# Patient Record
Sex: MALE | Race: WHITE
[De-identification: names, ages, dates, MRNs, and addresses within clinical notes are randomized per-mention and may not be internally consistent; named-entity substitution may affect disease eponyms.]

---

## 2019-11-14 ENCOUNTER — HOSPITAL ENCOUNTER (OUTPATIENT)
Dept: HOSPITAL 62 - OD | Age: 44
End: 2019-11-14
Attending: FAMILY MEDICINE
Payer: COMMERCIAL

## 2019-11-14 DIAGNOSIS — K76.0: Primary | ICD-10-CM

## 2019-11-14 DIAGNOSIS — Z83.3: ICD-10-CM

## 2019-11-14 DIAGNOSIS — M25.532: ICD-10-CM

## 2019-11-14 LAB
ALBUMIN SERPL-MCNC: 4.6 G/DL (ref 3.5–5)
ALP SERPL-CCNC: 51 U/L (ref 38–126)
ANION GAP SERPL CALC-SCNC: 9 MMOL/L (ref 5–19)
AST SERPL-CCNC: 36 U/L (ref 17–59)
BILIRUB DIRECT SERPL-MCNC: 0.2 MG/DL (ref 0–0.4)
BILIRUB SERPL-MCNC: 1.6 MG/DL (ref 0.2–1.3)
BUN SERPL-MCNC: 10 MG/DL (ref 7–20)
CALCIUM: 9.4 MG/DL (ref 8.4–10.2)
CHLORIDE SERPL-SCNC: 104 MMOL/L (ref 98–107)
CHOLEST SERPL-MCNC: 160.07 MG/DL (ref 0–200)
CO2 SERPL-SCNC: 29 MMOL/L (ref 22–30)
GLUCOSE SERPL-MCNC: 101 MG/DL (ref 75–110)
LDLC SERPL DIRECT ASSAY-MCNC: 101 MG/DL (ref ?–100)
POTASSIUM SERPL-SCNC: 4.9 MMOL/L (ref 3.6–5)
PROT SERPL-MCNC: 7.8 G/DL (ref 6.3–8.2)
TRIGL SERPL-MCNC: 114 MG/DL (ref ?–150)
VLDLC SERPL CALC-MCNC: 23 MG/DL (ref 10–31)

## 2019-11-14 PROCEDURE — 36415 COLL VENOUS BLD VENIPUNCTURE: CPT

## 2019-11-14 PROCEDURE — 80053 COMPREHEN METABOLIC PANEL: CPT

## 2019-11-14 PROCEDURE — 80061 LIPID PANEL: CPT

## 2019-11-14 NOTE — RADIOLOGY REPORT (SQ)
EXAM DESCRIPTION:  WRIST LEFT 2 VIEWS



COMPLETED DATE/TIME:  11/14/2019 8:17 am



REASON FOR STUDY:  PAIN IN LEFT WRIST K76.0  FATTY (CHANGE OF) LIVER, NOT ELSEWHERE CLASSIFIED Z83.3 
 FAMILY HISTORY OF DIABETES MELLITUS M25.532  PAIN IN LEFT WRIST



COMPARISON:  None.



NUMBER OF VIEWS:  Two views.



TECHNIQUE:  AP and lateral radiographic images acquired of the left wrist.



LIMITATIONS:  None.



FINDINGS:  MINERALIZATION: Normal.

BONES: No acute fracture or dislocation.  No worrisome bone lesions.  Normal alignment.

SOFT TISSUES: No soft tissue swelling.  No foreign body.

OTHER: No other significant finding.



IMPRESSION:  NEGATIVE STUDY OF THE LEFT WRIST. NO RADIOGRAPHIC EVIDENCE OF ACUTE INJURY.



TECHNICAL DOCUMENTATION:  JOB ID:  [id number]

 2011 [employer]- All Rights Reserved



Reading location - IP/workstation name: [email or web address]

## 2020-12-19 ENCOUNTER — HOSPITAL ENCOUNTER (OUTPATIENT)
Dept: HOSPITAL 62 - RDC | Age: 45
End: 2020-12-19
Attending: NURSE PRACTITIONER
Payer: COMMERCIAL

## 2020-12-19 VITALS — SYSTOLIC BLOOD PRESSURE: 135 MMHG | DIASTOLIC BLOOD PRESSURE: 79 MMHG

## 2020-12-19 DIAGNOSIS — Z20.828: Primary | ICD-10-CM

## 2020-12-19 PROCEDURE — C9803 HOPD COVID-19 SPEC COLLECT: HCPCS

## 2020-12-19 PROCEDURE — 99211 OFF/OP EST MAY X REQ PHY/QHP: CPT

## 2020-12-19 PROCEDURE — 87635 SARS-COV-2 COVID-19 AMP PRB: CPT

## 2020-12-19 PROCEDURE — 99201: CPT

## 2020-12-19 NOTE — ER RDC ASSESSMENT REPORT
Intake





- In the Last 14 days


Have you traveled outside [place]?: No


Have you been in close contact with someone CONFIRMED: Yes


Worked in Healthcare?: No





- Symptoms


Subjective Fever(Felt feverish): No


Chills: No


Muscule Aches: No


Runny Nose: No


Sore Throat: No


Cough (New or worsening chronic cough): No


Shortness of breath: No


Nausea or Vomiting: No


Headache: No


Abdominal Pain: No


Diarrhea(3 or more loose stools in last 24 hours): No





- Do you have any of the following


Chronic lung disease: Asthma or emphysema or COPD: No


Cystic Fibrosis: No


Diabetes: No


High Blood Pressure: No


Cardiovascular Disease: No


Chronic Kidney Disease: No


Chronic Liver Disease: Yes


Chronic Liver Disease Comment: 


Patient reports fatty liver.





Chronic blood disorder like Sickle Cell Disease: No


Weak immune system due to disease or medication: No


Neurologic condition that limits movement: No


Developmental delay - Moderate to Severe: No


Recent (within past 2 weeks) or current Pregnancy: No


Morbid Obesity (>100 pounds over ideal weight): No





- Objective


Temperature: 99.5 F


Pulse Rate: 81


Respiratory Rate: 18


Blood Pressure: 135/79


O2 Sat by Pulse Oximetry: 97


Objective: 


Patient is a well-appearing 45-year-old male, who presents today for COVID-19 

screening.








Disposition: Home; Selfcare





General





- General


Stated Complaint: COVID-19 screening


Mode of Arrival: Ambulatory


Information source: Patient


Notes: 


The patient was evaluated during the global COVID-19 pandemic. That diagnosis 

was suspected/considered upon initial presentation. Their evaluation, treatment,

and testing was consistent with current guidelines for patients who present with

complaints or symptoms that may be related to COVID-19.





Patient reports having close contact exposure to a COVID-19 lab confirmed 

positive individual.








- HPI


Patient complains to provider of: Asymptomatic, no complaint


Quality of pain: No pain


Severity: None


Pain Level: Denies


Associated symptoms: None


Exacerbated by: Denies


Relieved by: Denies


Similar symptoms previously: No


Recently seen / treated by doctor: No





Past Medical History





- General


Information source: Patient





- Social History


Smoking Status: Never Smoker


Cigarette use (# per day): No


Chew tobacco use (# tins/day): No


Smoking Education Provided: No


Frequency of alcohol use: Occasional


Drug Abuse: None


Occupation: [employer] duty [employer]


Lives with: Family


Patient has suicidal ideation: No


Patient has homicidal ideation: No





Physical Exam





- General


General appearance: Appears well


In distress: None


Notes: 


PHYSICAL EXAMINATION: 


GENERAL: Well-appearing with No Acute Distress noted.  


HEAD: Atraumatic, Normocephalic. 


EYES: Sclera anicteric, Conjunctiva are pink and moist. 


ENT: Nares patent. Moist mucous membranes. 


NECK: Normal range of motion, supple without lymphadenopathy. 


LUNGS: CTAB and equal. No wheezes rales or rhonchi. 


HEART: Regular rate and rhythm without murmurs. 


ABDOMEN: Soft, nontender, normal bowel sounds, no guarding. 


EXTREMITIES: Normal range of motion, no pitting edema. No cyanosis. 


BACK: No midline or CVA tenderness. No step-off or deformity. 


NEUROLOGICAL: Cranial nerves grossly intact. Normal speech. Normal gait.


PSYCH: Calm, Cooperative, and answers questions appropriately. Normal mood and 

affect.


SKIN: Warm, Dry, Normal color and Turgor, No obvious lesions or rash noted.











Diagnostic Results


Laboratory Results: 


Patient advised at this time they are considered a Person Under Investigation 

(PUI) for the COVID-19 Coronavirus. They have been made aware it is currently 

taking 3 to 5 days to receive their results. Patient advised The [facility] will call to notify them of a POSITIVE result, and an [facility] of [facility] team member will call to notify them of a NEGATIVE result.





Patient Education/Counseling


Counseling/Education: 


Patient presents with upper respiratory symptoms worrisome for possible COVID-

19.  Patient does not have symptoms worrisome as an emergency such as difficulty

breathing, shortness of breath, chest pain, pressure, confusion or cyanosis.  

Patient appears suitable for discharge.  Patient's vital signs are stable and 

patient is nontoxic in appearance.  Good return precautions have been discussed 

with patient, patient verbalized understanding and is agreeable with discharge 

plan of care at this time.





Patient provided COVID-19 discharge instructions to include:





As a person under investigation for COVID-19, the [place] department of 

Health and Human Services, division of public health advises you to adhere to 

the following guidance until your test results are reported to you.  If your 

test result is positive, you will receive additional information from your 

provider and your local health department at that time.


 


Remain at home until you are cleared by the health provider or public health 

authorities.


 


Keep a log of visitors to your home, notify any visitors to your home of your 

isolation status.


 


If you plan to move to a new address or leave the county, notify the local 

health department in your County.


 


Call your doctor or seek care if you have an urgent medical need.  Before 

seeking medical care, call ahead to get instructions from the provider before 

arriving at the medical office clinic or hospital.  Notify them that you are 

being tested for the virus that causes COVID-19 so that arrangements can be 

made, as necessary, to prevent transmission to others in the healthcare setting.

 Next, notify the local health department in your county.


 


If a medical emergency arises and you need to call 911, inform dispatch and the 

first responders that you are being tested for the virus that causes COVID-19.  

Next, notify the local health department in your county.





Guidance for worsening S/SX: 


For worsening symptoms, patient has been advised to contact their Primary Care 

Provider, or go to the nearest Emergency Department.








RDC Discharge





- Discharge


Clinical Impression: 


 COVID-19 Screening 





URI (upper respiratory infection)


Qualifiers:


 URI type: unspecified URI Qualified Code(s): J06.9 - Acute upper respiratory 

infection, unspecified





Condition: Stable


Disposition: Home; Selfcare